# Patient Record
Sex: FEMALE | Race: WHITE
[De-identification: names, ages, dates, MRNs, and addresses within clinical notes are randomized per-mention and may not be internally consistent; named-entity substitution may affect disease eponyms.]

---

## 2018-11-28 ENCOUNTER — HOSPITAL ENCOUNTER (OUTPATIENT)
Dept: HOSPITAL 63 - SURG | Age: 78
Discharge: HOME | End: 2018-11-28
Attending: ANESTHESIOLOGY
Payer: MEDICARE

## 2018-11-28 ENCOUNTER — HOSPITAL ENCOUNTER (EMERGENCY)
Dept: HOSPITAL 63 - ER | Age: 78
Discharge: ANOTHER HOSPITAL | End: 2018-11-28
Payer: MEDICARE

## 2018-11-28 VITALS — HEIGHT: 65 IN | BODY MASS INDEX: 27.32 KG/M2 | WEIGHT: 164 LBS

## 2018-11-28 VITALS — DIASTOLIC BLOOD PRESSURE: 62 MMHG | SYSTOLIC BLOOD PRESSURE: 149 MMHG

## 2018-11-28 VITALS — DIASTOLIC BLOOD PRESSURE: 62 MMHG | SYSTOLIC BLOOD PRESSURE: 137 MMHG

## 2018-11-28 DIAGNOSIS — Z88.6: ICD-10-CM

## 2018-11-28 DIAGNOSIS — Z86.73: ICD-10-CM

## 2018-11-28 DIAGNOSIS — Z00.6: Primary | ICD-10-CM

## 2018-11-28 DIAGNOSIS — Z85.828: ICD-10-CM

## 2018-11-28 DIAGNOSIS — Z91.041: ICD-10-CM

## 2018-11-28 DIAGNOSIS — M48.062: ICD-10-CM

## 2018-11-28 DIAGNOSIS — Z88.0: ICD-10-CM

## 2018-11-28 DIAGNOSIS — Z88.5: ICD-10-CM

## 2018-11-28 DIAGNOSIS — I60.9: Primary | ICD-10-CM

## 2018-11-28 DIAGNOSIS — K21.9: ICD-10-CM

## 2018-11-28 DIAGNOSIS — E03.9: ICD-10-CM

## 2018-11-28 DIAGNOSIS — I10: ICD-10-CM

## 2018-11-28 DIAGNOSIS — R56.9: ICD-10-CM

## 2018-11-28 DIAGNOSIS — Z79.82: ICD-10-CM

## 2018-11-28 DIAGNOSIS — I00: ICD-10-CM

## 2018-11-28 DIAGNOSIS — Z88.1: ICD-10-CM

## 2018-11-28 DIAGNOSIS — F41.9: ICD-10-CM

## 2018-11-28 DIAGNOSIS — Z79.899: ICD-10-CM

## 2018-11-28 LAB
ANION GAP SERPL CALC-SCNC: 10 MMOL/L (ref 6–14)
CA-I SERPL ISE-MCNC: 10 MG/DL (ref 7–20)
CALCIUM SERPL-MCNC: 8.9 MG/DL (ref 8.5–10.1)
CHLORIDE SERPL-SCNC: 97 MMOL/L (ref 98–107)
CO2 SERPL-SCNC: 31 MMOL/L (ref 21–32)
CREAT SERPL-MCNC: 0.6 MG/DL (ref 0.6–1)
ERYTHROCYTE [DISTWIDTH] IN BLOOD BY AUTOMATED COUNT: 13.1 % (ref 11.5–14.5)
GFR SERPLBLD BASED ON 1.73 SQ M-ARVRAT: 96.2 ML/MIN
GLUCOSE SERPL-MCNC: 196 MG/DL (ref 70–99)
HCT VFR BLD CALC: 42.7 % (ref 36–47)
HGB BLD-MCNC: 14.6 G/DL (ref 12–15.5)
MCH RBC QN AUTO: 32 PG (ref 25–35)
MCHC RBC AUTO-ENTMCNC: 34 G/DL (ref 31–37)
MCV RBC AUTO: 94 FL (ref 79–100)
PLATELET # BLD AUTO: 327 X10^3/UL (ref 140–400)
POTASSIUM SERPL-SCNC: 3.1 MMOL/L (ref 3.5–5.1)
RBC # BLD AUTO: 4.55 X10^6/UL (ref 3.5–5.4)
SODIUM SERPL-SCNC: 138 MMOL/L (ref 136–145)
WBC # BLD AUTO: 10.2 X10^3/UL (ref 4–11)

## 2018-11-28 PROCEDURE — 85610 PROTHROMBIN TIME: CPT

## 2018-11-28 PROCEDURE — 51701 INSERT BLADDER CATHETER: CPT

## 2018-11-28 PROCEDURE — 85027 COMPLETE CBC AUTOMATED: CPT

## 2018-11-28 PROCEDURE — A9585 GADOBUTROL INJECTION: HCPCS

## 2018-11-28 PROCEDURE — 36415 COLL VENOUS BLD VENIPUNCTURE: CPT

## 2018-11-28 PROCEDURE — 82947 ASSAY GLUCOSE BLOOD QUANT: CPT

## 2018-11-28 PROCEDURE — 70450 CT HEAD/BRAIN W/O DYE: CPT

## 2018-11-28 PROCEDURE — 93005 ELECTROCARDIOGRAM TRACING: CPT

## 2018-11-28 PROCEDURE — 80048 BASIC METABOLIC PNL TOTAL CA: CPT

## 2018-11-28 PROCEDURE — 0275T: CPT

## 2018-11-28 PROCEDURE — 85730 THROMBOPLASTIN TIME PARTIAL: CPT

## 2018-11-28 PROCEDURE — 84484 ASSAY OF TROPONIN QUANT: CPT

## 2018-11-28 NOTE — EKG
[facility] [address], [place] [address]

Test Date:    2018               Test Time:    16:03:22

Pat Name:     KEON DESAI          Department:   

Patient ID:   [id number]           Room:          

Gender:       F                        Technician:   

:          1940               Requested By: ADAMARIS EL

Order Number: [id number]            Reading MD:   Milton Diaz MD

                                 Measurements

Intervals                              Axis          

Rate:         93                       P:            9

IN:           192                      QRS:          41

QRSD:         94                       T:            32

QT:           394                                    

QTc:          493                                    

                           Interpretive Statements

SINUS RHYTHM

PROLONGED QT



Electronically Signed On 12-3-2018 8:20:39 CST by Milton Diaz MD

## 2018-11-28 NOTE — RAD
CT head without intravenous contrast

 

History: Altered mental status and hypoxia.  Code stroke.  Patient had 

some sort of lumbar procedure.

 

Comparison: None.

 

Technique: Axial images are obtained of the head from the skull base 

through the vertex without IV contrast.

 

Exposure: One or more of the following individualized dose reduction 

techniques were utilized for this examination:  1. Automated exposure 

control  2. Adjustment of the mA and/or kV according to patient size  3. 

Use of iterative reconstruction technique

 

Findings: 

Mild-moderate pneumocephalus is seen, appears confined to the subarachnoid

space and within the right lateral ventricle.  There is a large amount of 

dense material involving the subarachnoid space, can be seen in the basal 

cisterns as well as in the Sylvian fissures.  There is relative sparing of

superior hemispheric sulci.  No convincing intracranial mass is 

identified.  Evaluation for acute ischemic infarction is limited..

 

Bone windows demonstrate no acute calvarial abnormality. The visualized 

paranasal sinuses appear clear.

 

Impression: 

1.  Mild-moderate pneumocephalus, presumably iatrogenic.  

2.  Large amount of dense material is seen involving the subarachnoid 

space.  Given recent procedure, if there was intentional or inadvertent 

injection of contrast material into the thecal sac, this could be 

intrathecal contrast.  Alternatively, a large amount of acute subarachnoid

hemorrhage can have this appearance.

3.  Results were called to emergency from staff, Dr. Rodriguez, at 1634 

hours.

 

Electronically signed by: Jonathon Clements MD (11/28/2018 4:42 PM) [place] [id number]

## 2018-11-28 NOTE — PHYS DOC
Adult General


Chief Complaint


Chief Complaint:  ALTERED MENTAL STATUS





HPI


HPI


78-year-old female presents from post procedure recovery with altered mental 

status. The patient had a spinal decompression procedure done earlier today by 

Dr. Rojo. Afterwards she complained of some dizziness and nausea. She had 

several rounds of vomiting and was given multiple antiemetics. It is reported 

that she vomited through these. The decision was made to have her admitted for 

further observation. There was going to be a delay in transport to EMS being so 

busy. The patient continued to become more confused and have altered mental 

status. She had 3 episodes of bradycardia into the 30s. 2 of these resolved 

spontaneously, the third atropine was given. About 2 hours prior to arriving in 

the emergency room the patient is mental status seemed to decline markedly. Due 

to the delay for EMS transport to [facility], the decision was 

made to bring her to the emergency room here at [facility]. The patient is 

repeating over and over again that she needs to go to the bathroom. She is not 

reliably answering any other questions.





Review of Systems


Review of Systems


Limited due to altered mental status


Constitutional: Denies fever or chills []


Eyes:  []


HENT:  []


Respiratory: Denies cough or shortness of breath []


Cardiovascular: No additional information not addressed in HPI []


GI: Nausea, vomiting[]


:  []


Musculoskeletal:  []


Integument:  []


Neurologic: Dizziness[]


Endocrine: D []





All other systems were reviewed and found to be within normal limits, except as 

documented in this note.





Allergies


Allergies





Allergies








Coded Allergies Type Severity Reaction Last Updated Verified


 


  Penicillins Allergy Unknown  6/20/18 Yes


 


  doxycycline Allergy Unknown  6/20/18 Yes


 


  iodine Allergy Unknown  6/20/18 Yes


 


  morphine Allergy Unknown  6/20/18 Yes











Physical Exam


Physical Exam





Constitutional: Well developed, well nourished, mild distress, non-toxic 

appearance. []


HENT: Normocephalic, atraumatic, bilateral external ears normal, oropharynx 

moist, no oral exudates, nose normal. []


Eyes: PERRLA, EOMI, conjunctiva normal, no discharge. [] 


Neck: Normal range of motion, no tenderness, supple, no stridor. [] 


Cardiovascular:Heart rate regular rhythm, rate 113,  []


Lungs & Thorax:  Bilateral breath sounds clear to auscultation []


Abdomen: Bowel sounds normal, soft, no tenderness, no masses, no pulsatile 

masses. [] 


Skin: Warm, dry, no erythema, no rash. [] 


Back: No tenderness, no CVA tenderness. [] 


Extremities: No tenderness, no cyanosis, no clubbing, ROM intact, no edema. [] 


Neurologic: Altered mental status. Repeating the same phrases over and over.[]


Psychologic: Anxious[]





EKG


EKG


Sinus rhythm, rate 93, normal axis, prolonged QTC, no ST elevations or 

depressions.[]





Radiology/Procedures


Radiology/Procedures


[]





Course & Med Decision Making


Course & Med Decision Making


Pertinent Labs and Imaging studies reviewed. (See chart for details)


EKG is unremarkable except for prolonged QTC. Labs are remarkable for potassium 

of 3.1. Patient's head CT shows air in the cranium as well as contrast and a 

subarachnoid bleed. I discussed the case with the neurosurgeon, Dr. Bowman 

and he is concerned it could be a large amount of blood. He is going into the 

procedure at this time and recommends transfer to . I have discussed the 

patient with the transfer center at . The patient was accepted by Dr. Torres 

for transfer. She will go emergently by ground ambulance.





While the patient was in the emergency room, she became less aware of her 

surroundings. She was only responding to painful stimuli, but she was still 

spontaneously moving her arms and had a protected airway. At no time did she 

have difficulty breathing. The transfer center and did not want to delay 

transport to consider intubation. I do not believe that she was absolutely 

necessary prior to transfer.


[]





Dragon Disclaimer


Dragon Disclaimer


This electronic medical record was generated, in whole or in part, using a 

voice recognition dictation system.





Departure


Departure:


Referrals:  


SHANEKA FERRIS MD (PCP)











ADAMARIS EL DO Nov 28, 2018 16:21